# Patient Record
(demographics unavailable — no encounter records)

---

## 2025-05-23 NOTE — ASSESSMENT
[FreeTextEntry1] : Presenting for four years of abdominal bloating, irregular BMs, abdominal cramping: -normal abdominal exam. - Stool testing and celiac testing including celiac genes sent today (mainly eats a gluten free diet). -Discussed alternating stools may be d/t overflow diarrhea. Failed trials of miralax, dulcolax, and fiber. Rx sent for low dose Linzess.  -Start Linzess, daily. Take at least 30 minutes before a meal on an empty stomach; loose stools and greater stool frequency may occur after administration with a high-fat breakfast.  - If inadequate response after 1 month, may increase dose to a maximum 290mcg daily. If inadequate response to maximal dose after two weeks, will switch to another agent. -Diarrhea is the most common s/e that usually occurs within the first 1-2 weeks of administration. Advised to stop medication and call office if diarrhea occurs. - Start IBgard daily to alleviate abdominal cramping. May consider Levsid if no relief from IBgard. - RTC in 6 weeks to discuss symptom response/ BMs. Will schedule for colonoscopy pending improvement in BMs.  - If colonoscopy unrevealing discussed SIBO testing, TCA trial, referral to dietician.

## 2025-05-23 NOTE — HISTORY OF PRESENT ILLNESS
[FreeTextEntry1] :  Ms. NEAL MORE is a 20-year-old female with no significant PMH. PSH: adenotonsillectomy (1/2022)  Presenting for four years of abdominal bloating, irregular BMs, and intermittent abdominal cramping.  Endorses infrequent BMs- may have three days of diarrhea followed by five days of no stool. Stool consistency varies between liquid to small hard solo. Reports daily intermittent abdominal cramping, usually post prandial and bothersome abdominal bloating. Abdominal cramping and bloating are unrelieved with a BM. Cramping resolves spontaneously or by making herself vomit.  Has made various attempts to identify food triggers for abdominal cramping- cut out gluten and diary, low fodmap diet. Unable to fully identify food triggers.  Tried dulcolax, miralax, and fiber supplements daily without good BM evacuations. Last BM was 5/18 (5 days ago).  Denies frequent episodes of emesis, rectal bleeding, unintentional weight loss, early satiety, dysphagia, heartburn or reflux on a regular basis, hematochezia or melena.  Evaluated by 2-3 other GIs for these symptoms. Advised to take a fiber supplement or eat high fiber cereals. No known stool or celiac testing performed. No prior h/o EGD/colonoscopy. No official evaluation with a dietician.  Mother is a patient of Dr. English.   Denies FH of IBD, celiac disease, CRC, or other GI-related cancers. Denies tobacco, alcohol, marijuana or other drug use.

## 2025-07-18 NOTE — ASSESSMENT
[FreeTextEntry1] : Presenting to f/u for [facility], abdominal bloating and cramping. Symptoms improved with Linzess, but now having less frequent stools. -Increase Linzess dose to 145mcg daily. - Advised to hold Linzess if liquid stool develops after 1-2 weeks of increased dose. Once stool is more formed, restart 145mcg Linzess and continue with QOD dosing. - RTC in 6 months or sooner PRN.

## 2025-07-18 NOTE — HISTORY OF PRESENT ILLNESS
[FreeTextEntry1] :  Ms. NEAL MORE is a 20-year-old female with no significant PMH. PSH: adenotonsillectomy (1/2022)  Presenting to f/u for four years of abdominal bloating, irregular BMs, and intermittent abdominal cramping.  Initially evaluated in office 5/23/25 for these symptoms. Stool studies, celiac testing performed and was normal.  Prescribed low dose Linzess, which initially provided her with daily soft BMs. After about two weeks, BM frequency decreased to QOD. Now having BMs 2-3x per week, but reports significant improvement in abdominal cramping and bloating.   Denies frequent episodes of emesis, rectal bleeding, unintentional weight loss, early satiety, dysphagia, heartburn or reflux on a regular basis, hematochezia or melena.  Mother is a patient of Dr. English.   Denies FH of IBD, celiac disease, CRC, or other GI-related cancers. Denies tobacco, alcohol, marijuana or other drug use.